# Patient Record
(demographics unavailable — no encounter records)

---

## 2025-03-31 NOTE — HISTORY OF PRESENT ILLNESS
[de-identified] : Requesting elective circumcision [FreeTextEntry6] : Due to frequent episodes of phimosis, the patient would like to consider circumcision.

## 2025-05-10 NOTE — HISTORY OF PRESENT ILLNESS
[EENT/Resp Symptoms] : EENT/RESPIRATORY SYMPTOMS [Runny nose] : runny nose [Nasal congestion] : nasal congestion [Clear rhinorrhea] : clear rhinorrhea [Nasal saline] : nasal saline [Acetaminophen] : acetaminophen [Last dose: _____] : last dose: [unfilled] [Nasal Congestion] : nasal congestion [Sore Throat] : sore throat [Headache] : headache [Stable] : stable [___ Day(s)] : [unfilled] day(s) [Known Exposure to COVID-19] : no known exposure to COVID-19 [Sick Contacts: ___] : no sick contacts [Fever] : no fever [Cough] : no cough [Decreased Appetite] : no decreased appetite [Vomiting] : no vomiting [Diarrhea] : no diarrhea [Rash] : no rash [FreeTextEntry4] : Claritin

## 2025-05-10 NOTE — DISCUSSION/SUMMARY
[FreeTextEntry1] :  Patient with sore throat and allergic rhinitis.   Rapid strep performed in office is negative. Will send throat culture to rule out strep.  Discussed prevention. Avoid allergen exposure if known. Wash hands and clothing after being outdoors. Recommend supportive care with oral long-acting antihistamine daily. Use nasal saline 2-3 times daily. Use Flonase 1 spray in each nostril at bedtime for 3-4 weeks.  RED FLAGS REVIEWED - indications for ED eval discussed, signs of distress/infection reviewed - parent mom agrees with plan, demonstrates an understanding, is able to repeat back instructions and has no questions at this time.  Call or return if symptoms persist or worsen.  Return sooner PRN.

## 2025-05-10 NOTE — PHYSICAL EXAM
[Erythematous Oropharynx] : erythematous oropharynx [NL] : warm, clear [Pale Nasal Mucosa] : pale nasal mucosa [Clear Rhinorrhea] : clear rhinorrhea [Hypertrophied Nasal Mucosa] : hypertrophied nasal mucosa [Exudate] : no exudate [de-identified] :  mucous posterior pharyngeal wall

## 2025-07-26 NOTE — DISCUSSION/SUMMARY
[MCV] : meningococcal conjugate vaccine [Full Activity without restrictions including Physical Education & Athletics] : Full Activity without restrictions including Physical Education & Athletics [] : The components of the vaccine(s) to be administered today are listed in the plan of care. The disease(s) for which the vaccine(s) are intended to prevent and the risks have been discussed with the caretaker.  The risks are also included in the appropriate vaccination information statements which have been provided to the patient's caregiver.  The caregiver has given consent to vaccinate. [Normal Growth] : growth [Normal Development] : development  [No Elimination Concerns] : elimination [Continue Regimen] : feeding [No Skin Concerns] : skin [Normal Sleep Pattern] : sleep [None] : no medical problems [Anticipatory Guidance Given] : Anticipatory guidance addressed as per the history of present illness section [Physical Growth and Development] : physical growth and development [Social and Academic Competence] : social and academic competence [Emotional Well-Being] : emotional well-being [Risk Reduction] : risk reduction [Violence and Injury Prevention] : violence and injury prevention [No Medications] : ~He/She~ is not on any medications [Patient] : patient [Parent/Guardian] : Parent/Guardian

## 2025-07-26 NOTE — PHYSICAL EXAM
[Alert] : alert [No Acute Distress] : no acute distress [Normocephalic] : normocephalic [EOMI Bilateral] : EOMI bilateral [Clear tympanic membranes with bony landmarks and light reflex present bilaterally] : clear tympanic membranes with bony landmarks and light reflex present bilaterally  [Pink Nasal Mucosa] : pink nasal mucosa [Nonerythematous Oropharynx] : nonerythematous oropharynx [Supple, full passive range of motion] : supple, full passive range of motion [No Palpable Masses] : no palpable masses [Clear to Auscultation Bilaterally] : clear to auscultation bilaterally [Regular Rate and Rhythm] : regular rate and rhythm [Normal S1, S2 audible] : normal S1, S2 audible [No Murmurs] : no murmurs [+2 Femoral Pulses] : +2 femoral pulses [Soft] : soft [NonTender] : non tender [Non Distended] : non distended [Normoactive Bowel Sounds] : normoactive bowel sounds [No Hepatomegaly] : no hepatomegaly [No Splenomegaly] : no splenomegaly [Jon: _____] : Jon [unfilled] [No Abnormal Lymph Nodes Palpated] : no abnormal lymph nodes palpated [Normal Muscle Tone] : normal muscle tone [No Gait Asymmetry] : no gait asymmetry [No pain or deformities with palpation of bone, muscles, joints] : no pain or deformities with palpation of bone, muscles, joints [+2 Patella DTR] : +2 patella DTR [Cranial Nerves Grossly Intact] : cranial nerves grossly intact [No Rash or Lesions] : no rash or lesions [de-identified] : Right thoracic curve

## 2025-07-26 NOTE — HISTORY OF PRESENT ILLNESS
[Mother] : mother [Yes] : Patient goes to dentist yearly [None] : Primary Fluoride Source: None [Up to date] : Up to date [Eats meals with family] : eats meals with family [Has family members/adults to turn to for help] : has family members/adults to turn to for help [Is permitted and is able to make independent decisions] : Is permitted and is able to make independent decisions [Grade: ____] : Grade: [unfilled] [Normal Performance] : normal performance [Normal Behavior/Attention] : normal behavior/attention [Normal Homework] : normal homework [Eats regular meals including adequate fruits and vegetables] : eats regular meals including adequate fruits and vegetables [Drinks non-sweetened liquids] : drinks non-sweetened liquids  [Calcium source] : calcium source [Has friends] : has friends [Has interests/participates in community activities/volunteers] : has interests/participates in community activities/volunteers. [No] : No cigarette smoke exposure [Has ways to cope with stress] : has ways to cope with stress [Displays self-confidence] : displays self-confidence [With Teen] : teen [NO] : No [Sleep Concerns] : no sleep concerns [Has concerns about body or appearance] : does not have concerns about body or appearance [Uses electronic nicotine delivery system] : does not use electronic nicotine delivery system [Exposure to electronic nicotine delivery system] : no exposure to electronic nicotine delivery system [Uses tobacco] : does not use tobacco [Exposure to tobacco] : no exposure to tobacco [Uses drugs] : does not use drugs  [Exposure to drugs] : no exposure to drugs [Drinks alcohol] : does not drink alcohol [Exposure to alcohol] : no exposure to alcohol [Has problems with sleep] : does not have problems with sleep [Gets depressed, anxious, or irritable/has mood swings] : does not get depressed, anxious, or irritable/has mood swings [Has thought about hurting self or considered suicide] : has not thought about hurting self or considered suicide [de-identified] : More comprehensive allergy testing